# Patient Record
Sex: FEMALE | Race: WHITE | NOT HISPANIC OR LATINO | Employment: PART TIME | URBAN - METROPOLITAN AREA
[De-identification: names, ages, dates, MRNs, and addresses within clinical notes are randomized per-mention and may not be internally consistent; named-entity substitution may affect disease eponyms.]

---

## 2020-09-10 LAB — EXTERNAL CHLAMYDIA RESULT: NEGATIVE

## 2021-08-25 ENCOUNTER — VBI (OUTPATIENT)
Dept: ADMINISTRATIVE | Facility: OTHER | Age: 21
End: 2021-08-25

## 2021-08-25 PROBLEM — Z97.5 IUD (INTRAUTERINE DEVICE) IN PLACE: Status: ACTIVE | Noted: 2021-08-25

## 2021-08-25 NOTE — PROGRESS NOTES
Assessment/Plan:    Irregular bleeding  Some spotting after coitus, resolved following day  Same partner x 3 years, +condoms, +Liletta  Monthly light bleeding with Liletta  Normal exam, cultures done  Reassurance given  RTO wellness 2 months, pap at that time  Diagnoses and all orders for this visit:    Irregular bleeding    Routine screening for STI (sexually transmitted infection)  -     Chlamydia/N  gonorrhoeae RNA, TMA    Other orders  -     albuterol (PROVENTIL HFA,VENTOLIN HFA) 90 mcg/act inhaler  -     busPIRone (BUSPAR) 15 mg tablet  -     desvenlafaxine succinate (PRISTIQ) 50 mg 24 hr tablet; Take 50 mg by mouth daily  -     ibuprofen (MOTRIN) 600 mg tablet  -     LORazepam (ATIVAN) 0 5 mg tablet; Take 0 5 mg by mouth 3 (three) times a day as needed  -     Trintellix 5 MG tablet; TAKE 2 TABLET BY MOUTH ONCE A DAY TAKE 1 TAB X2 WEEKS, THEN 1 TAB DAILY          Subjective:      Patient ID: Carole Poe is a 24 y o  female  Here for spotting after coitus  The following portions of the patient's history were reviewed and updated as appropriate: allergies, current medications, past family history, past medical history, past social history, past surgical history and problem list     Review of Systems  +monthly cycles, light   No menorrhagia, dysmenorrhea  +post coital spotting   No pelvic pain    +constipation and diarrhea as in past, no change in bowel function    Objective:      /80 (BP Location: Left arm, Patient Position: Sitting, Cuff Size: Standard)   Ht 5' 7" (1 702 m)   Wt 69 4 kg (153 lb)   BMI 23 96 kg/m²          Physical Exam  General appearance: no distress, pleasant  Pelvic exam: normal external genitalia, urethral meatus normal, vagina without lesions, cervix without lesions, IUD sting visible in os, no bleeding, uterus small, non tender, no adnexal masses, non tender  Rectal exam: deferred

## 2021-08-25 NOTE — TELEPHONE ENCOUNTER
Upon review of the In Basket request we were able to locate, review, and update the patient chart as requested for Chlamydia and Immunization(s)    Any additional questions or concerns should be emailed to the Practice Liaisons via [email or web address] email, please do not reply via In Basket      Thank you  Ezra Richard

## 2021-08-26 ENCOUNTER — OFFICE VISIT (OUTPATIENT)
Dept: OBGYN CLINIC | Facility: CLINIC | Age: 21
End: 2021-08-26
Payer: COMMERCIAL

## 2021-08-26 VITALS
DIASTOLIC BLOOD PRESSURE: 80 MMHG | HEIGHT: 67 IN | BODY MASS INDEX: 24.01 KG/M2 | WEIGHT: 153 LBS | SYSTOLIC BLOOD PRESSURE: 120 MMHG

## 2021-08-26 DIAGNOSIS — Z11.3 ROUTINE SCREENING FOR STI (SEXUALLY TRANSMITTED INFECTION): ICD-10-CM

## 2021-08-26 DIAGNOSIS — N92.6 IRREGULAR BLEEDING: Primary | ICD-10-CM

## 2021-08-26 PROCEDURE — 99213 OFFICE O/P EST LOW 20 MIN: CPT | Performed by: OBSTETRICS & GYNECOLOGY

## 2021-08-26 RX ORDER — LORAZEPAM 0.5 MG/1
0.5 TABLET ORAL 3 TIMES DAILY PRN
COMMUNITY
Start: 2021-06-15

## 2021-08-26 RX ORDER — BUSPIRONE HYDROCHLORIDE 15 MG/1
TABLET ORAL
COMMUNITY
Start: 2021-08-25

## 2021-08-26 RX ORDER — VORTIOXETINE 5 MG/1
TABLET, FILM COATED ORAL
COMMUNITY
Start: 2021-08-20 | End: 2021-11-05 | Stop reason: ALTCHOICE

## 2021-08-26 RX ORDER — DESVENLAFAXINE 25 MG/1
25 TABLET, EXTENDED RELEASE ORAL DAILY
COMMUNITY
Start: 2021-05-02

## 2021-08-26 RX ORDER — IBUPROFEN 600 MG/1
TABLET ORAL
COMMUNITY
Start: 2021-04-16

## 2021-08-26 RX ORDER — ALBUTEROL SULFATE 90 UG/1
AEROSOL, METERED RESPIRATORY (INHALATION)
COMMUNITY
Start: 2021-05-04

## 2021-08-27 LAB
C TRACH RRNA SPEC QL NAA+PROBE: NOT DETECTED
N GONORRHOEA RRNA SPEC QL NAA+PROBE: NOT DETECTED

## 2021-11-05 ENCOUNTER — ANNUAL EXAM (OUTPATIENT)
Dept: OBGYN CLINIC | Facility: CLINIC | Age: 21
End: 2021-11-05
Payer: COMMERCIAL

## 2021-11-05 VITALS
SYSTOLIC BLOOD PRESSURE: 110 MMHG | WEIGHT: 159.8 LBS | HEIGHT: 65 IN | DIASTOLIC BLOOD PRESSURE: 60 MMHG | BODY MASS INDEX: 26.62 KG/M2

## 2021-11-05 DIAGNOSIS — Z01.419 ENCOUNTER FOR GYNECOLOGICAL EXAMINATION (GENERAL) (ROUTINE) WITHOUT ABNORMAL FINDINGS: Primary | ICD-10-CM

## 2021-11-05 DIAGNOSIS — Z12.4 SCREENING FOR MALIGNANT NEOPLASM OF THE CERVIX: ICD-10-CM

## 2021-11-05 PROCEDURE — S0612 ANNUAL GYNECOLOGICAL EXAMINA: HCPCS | Performed by: OBSTETRICS & GYNECOLOGY

## 2021-11-14 LAB
CLINICAL INFO: NORMAL
CYTO CVX: NORMAL
CYTOLOGY CMNT CVX/VAG CYTO-IMP: NORMAL
DATE PREVIOUS BX: NORMAL
LMP START DATE: NORMAL
SL AMB PREV. PAP:: NORMAL
SPECIMEN SOURCE CVX/VAG CYTO: NORMAL

## 2024-02-21 PROBLEM — Z01.419 ENCOUNTER FOR GYNECOLOGICAL EXAMINATION (GENERAL) (ROUTINE) WITHOUT ABNORMAL FINDINGS: Status: RESOLVED | Noted: 2021-11-05 | Resolved: 2024-02-21

## 2024-12-18 NOTE — PROGRESS NOTES
Assessment/Plan:    Encounter for gynecological examination (general) (routine) without abnormal findings  Here for well check, minimal spotting randomly with Liletta (1/2020)  No breast or gyn concerns.   Same partner x 7 years.    Normal breast and pelvic exams.  Last pap 11/2021 neg; repeated today with cultures    IUD - Liletta 1/14/2020  Discussed 8 year indication and option to replace if bleeding becomes more of an issue.  Will monitor and contact if interested in replacement.       Diagnoses and all orders for this visit:    Encounter for gynecological examination (general) (routine) without abnormal findings    IUD - Liletta 1/14/2020    Screening for malignant neoplasm of the cervix  -     Thinprep Tis Pap Reflex HPV mRNA E6/E7, Chlamydia/N.gonorrhoeae    Other orders  -     calcium citrate-vitamin D 315 mg-5 mcg tablet; Take 1 tablet by mouth 2 (two) times a day          Subjective:      Patient ID: Dorina Stokes is a 24 y.o. female.    HPI Here for well check, pap due.    The following portions of the patient's history were reviewed and updated as appropriate: She  has a past medical history of Anxiety, Depression, Gardasil complete - pt to confirm with mother, and History of pelvic ultrasound (09/16/2020).  She   Patient Active Problem List    Diagnosis Date Noted    Irregular bleeding 08/26/2021    IUD - Liletta 1/14/2020 08/25/2021     She  has a past surgical history that includes [place] tooth extraction (Bilateral).  Her family history includes Heart attack (age of onset: 50) in her maternal grandmother; No Known Problems in her maternal aunt and mother.  She  reports that she has never smoked. She has never used smokeless tobacco. She reports that she does not currently use alcohol. She reports that she does not use drugs.  Current Outpatient Medications   Medication Sig Dispense Refill    albuterol (PROVENTIL HFA,VENTOLIN HFA) 90 mcg/act inhaler       calcium citrate-vitamin D 315 mg-5 mcg tablet  "Take 1 tablet by mouth 2 (two) times a day      Desvenlafaxine Succinate ER 25 MG TB24 Take 25 mg by mouth daily       Levonorgestrel (LILETTA) 19.5 MCG/DAY IUD IUD 1 each by Intrauterine route once       No current facility-administered medications for this visit.     She is allergic to methylprednisolone..    Review of Systems  +rare spotting  No breast, bladder, bowel changes. No new persistent pain.    Objective:    /64 (BP Location: Left arm, Patient Position: Sitting, Cuff Size: Standard)   Ht 5' 6\" (1.676 m)   Wt 78.5 kg (173 lb)   BMI 27.92 kg/m²      Physical Exam    General appearance: no distress, pleasant  Neck: thyroid without nodules or thyromegaly, no palpable adenopathy  Lymph nodes: no palpable adenopathy  Breasts: no masses, nodes or skin changes, bilateral piercings  Abdomen: soft, non tender, no palpable masses  Pelvic exam: normal external genitalia, urethral meatus normal, vagina without lesions, cervix without lesions and with IUD string in os,  uterus small, non tender, no adnexal masses, non tender  Rectal exam: deferred    "

## 2024-12-20 ENCOUNTER — OFFICE VISIT (OUTPATIENT)
Dept: OBGYN CLINIC | Facility: CLINIC | Age: 24
End: 2024-12-20
Payer: COMMERCIAL

## 2024-12-20 VITALS
HEIGHT: 66 IN | WEIGHT: 173 LBS | DIASTOLIC BLOOD PRESSURE: 64 MMHG | BODY MASS INDEX: 27.8 KG/M2 | SYSTOLIC BLOOD PRESSURE: 114 MMHG

## 2024-12-20 DIAGNOSIS — Z97.5 IUD (INTRAUTERINE DEVICE) IN PLACE: ICD-10-CM

## 2024-12-20 DIAGNOSIS — Z01.419 ENCOUNTER FOR GYNECOLOGICAL EXAMINATION (GENERAL) (ROUTINE) WITHOUT ABNORMAL FINDINGS: Primary | ICD-10-CM

## 2024-12-20 DIAGNOSIS — Z12.4 SCREENING FOR MALIGNANT NEOPLASM OF THE CERVIX: ICD-10-CM

## 2024-12-20 PROCEDURE — S0612 ANNUAL GYNECOLOGICAL EXAMINA: HCPCS | Performed by: OBSTETRICS & GYNECOLOGY

## 2024-12-20 RX ORDER — LANOLIN ALCOHOL/MO/W.PET/CERES
1 CREAM (GRAM) TOPICAL 2 TIMES DAILY
COMMUNITY

## 2024-12-20 NOTE — PATIENT INSTRUCTIONS
Return to office in one year unless having any problems.    Please call the office if you do not hear about your results in 10-14 days.

## 2024-12-20 NOTE — ASSESSMENT & PLAN NOTE
Discussed 8 year indication and option to replace if bleeding becomes more of an issue.  Will monitor and contact if interested in replacement.

## 2024-12-20 NOTE — ASSESSMENT & PLAN NOTE
Here for well check, minimal spotting randomly with Ben (1/2020)  No breast or gyn concerns.   Same partner x 7 years.    Normal breast and pelvic exams.  Last pap 11/2021 neg; repeated today with cultures

## 2024-12-20 NOTE — LETTER
December 20, 2024     PHYLLIS Mitchell  [address] [facility] [address]    Patient: Dorina Stokes   YOB: 2000   Date of Visit: 12/20/2024       Dear Kami:    Dorina Stokes was in today for her annual gyn exam. Below are my notes for this visit.    If you have questions, please do not hesitate to call me. I look forward to following your patient along with you.         Sincerely,        Ro Dial MD        CC: No Recipients    Ro Dial MD  12/20/2024  9:04 AM  Sign when Signing Visit  Assessment/Plan:    Encounter for gynecological examination (general) (routine) without abnormal findings  Here for well check, minimal spotting randomly with Liletta (1/2020)  No breast or gyn concerns.   Same partner x 7 years.    Normal breast and pelvic exams.  Last pap 11/2021 neg; repeated today with cultures    IUD - Liletta 1/14/2020  Discussed 8 year indication and option to replace if bleeding becomes more of an issue.  Will monitor and contact if interested in replacement.       Diagnoses and all orders for this visit:    Encounter for gynecological examination (general) (routine) without abnormal findings    IUD - Liletta 1/14/2020    Screening for malignant neoplasm of the cervix  -     Thinprep Tis Pap Reflex HPV mRNA E6/E7, Chlamydia/N.gonorrhoeae    Other orders  -     calcium citrate-vitamin D 315 mg-5 mcg tablet; Take 1 tablet by mouth 2 (two) times a day          Subjective:      Patient ID: Dorina Stokes is a 24 y.o. female.    HPI Here for well check, pap due.    The following portions of the patient's history were reviewed and updated as appropriate: She  has a past medical history of Anxiety, Depression, Gardasil complete - pt to confirm with mother, and History of pelvic ultrasound (09/16/2020).  She   Patient Active Problem List    Diagnosis Date Noted   • Irregular bleeding 08/26/2021   • IUD - Liletta 1/14/2020 08/25/2021     She  has a past surgical history that includes  "[place] tooth extraction (Bilateral).  Her family history includes Heart attack (age of onset: 50) in her maternal grandmother; No Known Problems in her maternal aunt and mother.  She  reports that she has never smoked. She has never used smokeless tobacco. She reports that she does not currently use alcohol. She reports that she does not use drugs.  Current Outpatient Medications   Medication Sig Dispense Refill   • albuterol (PROVENTIL HFA,VENTOLIN HFA) 90 mcg/act inhaler      • calcium citrate-vitamin D 315 mg-5 mcg tablet Take 1 tablet by mouth 2 (two) times a day     • Desvenlafaxine Succinate ER 25 MG TB24 Take 25 mg by mouth daily      • Levonorgestrel (LILETTA) 19.5 MCG/DAY IUD IUD 1 each by Intrauterine route once       No current facility-administered medications for this visit.     She is allergic to methylprednisolone..    Review of Systems  +rare spotting  No breast, bladder, bowel changes. No new persistent pain.    Objective:    /64 (BP Location: Left arm, Patient Position: Sitting, Cuff Size: Standard)   Ht 5' 6\" (1.676 m)   Wt 78.5 kg (173 lb)   BMI 27.92 kg/m²      Physical Exam    General appearance: no distress, pleasant  Neck: thyroid without nodules or thyromegaly, no palpable adenopathy  Lymph nodes: no palpable adenopathy  Breasts: no masses, nodes or skin changes, bilateral piercings  Abdomen: soft, non tender, no palpable masses  Pelvic exam: normal external genitalia, urethral meatus normal, vagina without lesions, cervix without lesions and with IUD string in os,  uterus small, non tender, no adnexal masses, non tender  Rectal exam: deferred    "

## 2024-12-21 LAB
C TRACH RRNA SPEC QL NAA+PROBE: NOT DETECTED
N GONORRHOEA RRNA SPEC QL NAA+PROBE: NOT DETECTED

## 2024-12-27 LAB
C TRACH RRNA SPEC QL NAA+PROBE: NOT DETECTED
CLINICAL INFO: NORMAL
CYTO CVX: NORMAL
CYTOLOGY CMNT CVX/VAG CYTO-IMP: NORMAL
DATE PREVIOUS BX: NORMAL
LMP START DATE: NORMAL
N GONORRHOEA RRNA SPEC QL NAA+PROBE: NOT DETECTED
SL AMB PREV. PAP:: NORMAL
SPECIMEN SOURCE CVX/VAG CYTO: NORMAL

## 2024-12-28 ENCOUNTER — RESULTS FOLLOW-UP (OUTPATIENT)
Dept: OBGYN CLINIC | Facility: CLINIC | Age: 24
End: 2024-12-28

## 2025-03-28 ENCOUNTER — TELEPHONE (OUTPATIENT)
Age: 25
End: 2025-03-28

## 2025-03-28 NOTE — TELEPHONE ENCOUNTER
Flavia, with [place] [facility], called to ask for a copy of patient's most recent pap results. Needs medical release signed by patient. No further questions.